# Patient Record
Sex: FEMALE
[De-identification: names, ages, dates, MRNs, and addresses within clinical notes are randomized per-mention and may not be internally consistent; named-entity substitution may affect disease eponyms.]

---

## 2019-03-07 ENCOUNTER — HOSPITAL ENCOUNTER (OUTPATIENT)
Dept: HOSPITAL 90 - EDH | Age: 59
Setting detail: OBSERVATION
LOS: 1 days | Discharge: HOME | End: 2019-03-08
Attending: INTERNAL MEDICINE | Admitting: INTERNAL MEDICINE
Payer: COMMERCIAL

## 2019-03-07 DIAGNOSIS — Z82.3: ICD-10-CM

## 2019-03-07 DIAGNOSIS — R20.0: Primary | ICD-10-CM

## 2019-03-07 DIAGNOSIS — Z82.49: ICD-10-CM

## 2019-03-07 DIAGNOSIS — Z88.8: ICD-10-CM

## 2019-03-07 DIAGNOSIS — I10: ICD-10-CM

## 2019-03-07 DIAGNOSIS — F41.9: ICD-10-CM

## 2019-03-07 DIAGNOSIS — Z83.3: ICD-10-CM

## 2019-03-07 DIAGNOSIS — Z82.0: ICD-10-CM

## 2019-03-07 DIAGNOSIS — Z82.5: ICD-10-CM

## 2019-03-07 DIAGNOSIS — Z87.442: ICD-10-CM

## 2019-03-07 DIAGNOSIS — Z79.899: ICD-10-CM

## 2019-03-07 LAB
ALBUMIN SERPL-MCNC: 3.8 G/DL (ref 3.5–5)
ALT SERPL-CCNC: 19 U/L (ref 12–78)
AST SERPL-CCNC: 14 U/L (ref 10–37)
BASOPHILS NFR BLD AUTO: 0.5 % (ref 0–5)
BILIRUB DIRECT SERPL-MCNC: 0.1 MG/DL (ref 0–0.3)
BILIRUB SERPL-MCNC: 0.7 MG/DL (ref 0.2–1)
BNP SERPL-MCNC: 5 PG/ML (ref 0–100)
BUN SERPL-MCNC: 13 MG/DL (ref 7–18)
CHLORIDE SERPL-SCNC: 103 MMOL/L (ref 101–111)
CK SERPL-CCNC: 217 U/L (ref 21–232)
CO2 SERPL-SCNC: 29 MMOL/L (ref 21–32)
CREAT SERPL-MCNC: 0.7 MG/DL (ref 0.5–1.5)
CRP SERPL HS-MCNC: 8.4 MG/L (ref 0–9)
EOSINOPHIL NFR BLD AUTO: 0.7 % (ref 0–8)
ERYTHROCYTE [DISTWIDTH] IN BLOOD BY AUTOMATED COUNT: 13.7 % (ref 11–15.5)
GFR SERPL CREATININE-BSD FRML MDRD: 91 ML/MIN (ref 60–?)
GLUCOSE SERPL-MCNC: 102 MG/DL (ref 70–105)
HBA1C MFR BLD: 5.9 % (ref 4–6)
HCT VFR BLD AUTO: 39.3 % (ref 36–48)
LYMPHOCYTES NFR SPEC AUTO: 17.2 % (ref 21–51)
MAGNESIUM SERPL-MCNC: 1.9 MG/DL (ref 1.8–2.4)
MCH RBC QN AUTO: 30.3 PG (ref 27–33)
MCHC RBC AUTO-ENTMCNC: 33.5 G/DL (ref 32–36)
MCV RBC AUTO: 90.2 FL (ref 79–99)
MONOCYTES NFR BLD AUTO: 6 % (ref 3–13)
NEUTROPHILS NFR BLD AUTO: 75.6 % (ref 40–77)
NRBC BLD MANUAL-RTO: 0 % (ref 0–0.19)
PH UR STRIP: 6.5 [PH] (ref 5–8)
PHOSPHATE SERPL-MCNC: 3.6 MG/DL (ref 2.5–4.9)
PLATELET # BLD AUTO: 268 K/UL (ref 130–400)
POTASSIUM SERPL-SCNC: 4.1 MMOL/L (ref 3.5–5.1)
PROT SERPL-MCNC: 7.8 G/DL (ref 6–8.3)
RBC # BLD AUTO: 4.36 MIL/UL (ref 4–5.5)
RBC #/AREA URNS HPF: (no result) /HPF (ref 0–1)
SODIUM SERPL-SCNC: 140 MMOL/L (ref 136–145)
SP GR UR STRIP: 1.01 (ref 1–1.03)
TSH SERPL DL<=0.05 MIU/L-ACNC: 1.1 UIU/ML (ref 0.36–3.74)
UROBILINOGEN UR STRIP-MCNC: 0.2 MG/DL (ref 0.2–1)
WBC # BLD AUTO: 10.5 K/UL (ref 4.8–10.8)
WBC #/AREA URNS HPF: (no result) /HPF (ref 0–1)

## 2019-03-07 PROCEDURE — 80048 BASIC METABOLIC PNL TOTAL CA: CPT

## 2019-03-07 PROCEDURE — 86140 C-REACTIVE PROTEIN: CPT

## 2019-03-07 PROCEDURE — 93306 TTE W/DOPPLER COMPLETE: CPT

## 2019-03-07 PROCEDURE — 84443 ASSAY THYROID STIM HORMONE: CPT

## 2019-03-07 PROCEDURE — 96372 THER/PROPH/DIAG INJ SC/IM: CPT

## 2019-03-07 PROCEDURE — 84484 ASSAY OF TROPONIN QUANT: CPT

## 2019-03-07 PROCEDURE — 83880 ASSAY OF NATRIURETIC PEPTIDE: CPT

## 2019-03-07 PROCEDURE — 70450 CT HEAD/BRAIN W/O DYE: CPT

## 2019-03-07 PROCEDURE — 85025 COMPLETE CBC W/AUTO DIFF WBC: CPT

## 2019-03-07 PROCEDURE — 84100 ASSAY OF PHOSPHORUS: CPT

## 2019-03-07 PROCEDURE — 80061 LIPID PANEL: CPT

## 2019-03-07 PROCEDURE — 82550 ASSAY OF CK (CPK): CPT

## 2019-03-07 PROCEDURE — 93005 ELECTROCARDIOGRAM TRACING: CPT

## 2019-03-07 PROCEDURE — 36415 COLL VENOUS BLD VENIPUNCTURE: CPT

## 2019-03-07 PROCEDURE — 93880 EXTRACRANIAL BILAT STUDY: CPT

## 2019-03-07 PROCEDURE — 80076 HEPATIC FUNCTION PANEL: CPT

## 2019-03-07 PROCEDURE — 70544 MR ANGIOGRAPHY HEAD W/O DYE: CPT

## 2019-03-07 PROCEDURE — 83036 HEMOGLOBIN GLYCOSYLATED A1C: CPT

## 2019-03-07 PROCEDURE — 82607 VITAMIN B-12: CPT

## 2019-03-07 PROCEDURE — 81001 URINALYSIS AUTO W/SCOPE: CPT

## 2019-03-07 PROCEDURE — 99284 EMERGENCY DEPT VISIT MOD MDM: CPT

## 2019-03-07 PROCEDURE — 83735 ASSAY OF MAGNESIUM: CPT

## 2019-03-07 PROCEDURE — 70551 MRI BRAIN STEM W/O DYE: CPT

## 2019-03-08 VITALS — SYSTOLIC BLOOD PRESSURE: 108 MMHG | DIASTOLIC BLOOD PRESSURE: 77 MMHG

## 2019-03-08 VITALS — DIASTOLIC BLOOD PRESSURE: 70 MMHG | SYSTOLIC BLOOD PRESSURE: 124 MMHG

## 2019-03-08 VITALS — SYSTOLIC BLOOD PRESSURE: 130 MMHG | DIASTOLIC BLOOD PRESSURE: 77 MMHG

## 2019-03-08 VITALS — SYSTOLIC BLOOD PRESSURE: 125 MMHG | DIASTOLIC BLOOD PRESSURE: 67 MMHG

## 2019-03-08 VITALS — DIASTOLIC BLOOD PRESSURE: 72 MMHG | SYSTOLIC BLOOD PRESSURE: 112 MMHG

## 2019-03-08 LAB
CHOLEST SERPL-MCNC: 148 MG/DL (ref ?–200)
HBA1C MFR BLD: 6 % (ref 4–6)
HDLC SERPL-MCNC: 53 MG/DL (ref 35–85)
LDLC SERPL CALC-MCNC: 90 MG/DL (ref 0–99)
TRIGL SERPL-MCNC: 67 MG/DL (ref 30–200)

## 2019-03-08 RX ADMIN — ACETAMINOPHEN PRN MG: 325 TABLET, FILM COATED ORAL at 08:13

## 2019-03-08 RX ADMIN — ACETAMINOPHEN PRN MG: 325 TABLET, FILM COATED ORAL at 18:53

## 2019-03-08 NOTE — NUR
DISCHARGE

PT'S 2D ECHO DONE AT SHIFT CHANGE NORMAL, DISCHARGED TO HOME AS PER ORDER. NO FURTHER 
COMPLAINTS OF ANY HEADACHES, NORMOTENSIVE, DISCHARGE INSTRUCTIONS GIVEN. PIV D/C, DRESSING 
APPLIED. TELEMETRY D/C. PT WAITED FOR A FRIEND TO PICK HER UP TO ASSIST WITH DISCHARGE. VS 
STABLE, NOT IN ANY FORM OF DISTRESS.

## 2019-03-08 NOTE — NUR
OANH NOTE

DCED IN OBS STATUS  TO HOME , NO NEEDS EXPRESSED BY PT TO PRIMARY RN, NO TRIGGERS RECD BY RN 
ASSESSMENT. 

-------------------------------------------------------------------------------

Addendum: 03/08/19 at 1944 by STACY GERARD RN CM

-------------------------------------------------------------------------------

Amended: Links added.

## 2021-02-08 LAB
BASOPHILS NFR BLD AUTO: 0.7 % (ref 0–5)
BUN SERPL-MCNC: 11 MG/DL (ref 7–18)
CHLORIDE SERPL-SCNC: 104 MMOL/L (ref 101–111)
CO2 SERPL-SCNC: 29 MMOL/L (ref 21–32)
CREAT SERPL-MCNC: 0.8 MG/DL (ref 0.5–1.5)
EOSINOPHIL NFR BLD AUTO: 1.9 % (ref 0–8)
ERYTHROCYTE [DISTWIDTH] IN BLOOD BY AUTOMATED COUNT: 13.4 % (ref 11–15.5)
GFR SERPL CREATININE-BSD FRML MDRD: 78 ML/MIN (ref 60–?)
GLUCOSE SERPL-MCNC: 110 MG/DL (ref 70–105)
HCT VFR BLD AUTO: 41.4 % (ref 36–48)
LYMPHOCYTES NFR SPEC AUTO: 28.2 % (ref 21–51)
MCH RBC QN AUTO: 29.5 PG (ref 27–33)
MCHC RBC AUTO-ENTMCNC: 31.6 G/DL (ref 32–36)
MCV RBC AUTO: 93.2 FL (ref 79–99)
MONOCYTES NFR BLD AUTO: 6.8 % (ref 3–13)
NEUTROPHILS NFR BLD AUTO: 61.9 % (ref 40–77)
NRBC BLD MANUAL-RTO: 0 % (ref 0–0.19)
PLATELET # BLD AUTO: 316 K/UL (ref 130–400)
POTASSIUM SERPL-SCNC: 3.9 MMOL/L (ref 3.5–5.1)
RBC # BLD AUTO: 4.44 MIL/UL (ref 4–5.5)
SODIUM SERPL-SCNC: 142 MMOL/L (ref 136–145)
WBC # BLD AUTO: 8.2 K/UL (ref 4.8–10.8)

## 2021-02-09 VITALS — DIASTOLIC BLOOD PRESSURE: 77 MMHG | SYSTOLIC BLOOD PRESSURE: 160 MMHG

## 2021-02-10 ENCOUNTER — HOSPITAL ENCOUNTER (OUTPATIENT)
Dept: HOSPITAL 90 - DAH | Age: 61
Discharge: HOME | End: 2021-02-10
Attending: ORTHOPAEDIC SURGERY
Payer: COMMERCIAL

## 2021-02-10 VITALS — DIASTOLIC BLOOD PRESSURE: 75 MMHG | SYSTOLIC BLOOD PRESSURE: 135 MMHG

## 2021-02-10 VITALS — DIASTOLIC BLOOD PRESSURE: 78 MMHG | SYSTOLIC BLOOD PRESSURE: 117 MMHG

## 2021-02-10 VITALS — WEIGHT: 208.2 LBS | HEIGHT: 64 IN | BODY MASS INDEX: 35.55 KG/M2

## 2021-02-10 VITALS — SYSTOLIC BLOOD PRESSURE: 123 MMHG | DIASTOLIC BLOOD PRESSURE: 73 MMHG

## 2021-02-10 VITALS — SYSTOLIC BLOOD PRESSURE: 140 MMHG | DIASTOLIC BLOOD PRESSURE: 80 MMHG

## 2021-02-10 VITALS — DIASTOLIC BLOOD PRESSURE: 81 MMHG | SYSTOLIC BLOOD PRESSURE: 125 MMHG

## 2021-02-10 VITALS — DIASTOLIC BLOOD PRESSURE: 75 MMHG | SYSTOLIC BLOOD PRESSURE: 134 MMHG

## 2021-02-10 VITALS — SYSTOLIC BLOOD PRESSURE: 127 MMHG | DIASTOLIC BLOOD PRESSURE: 76 MMHG

## 2021-02-10 VITALS — SYSTOLIC BLOOD PRESSURE: 111 MMHG | DIASTOLIC BLOOD PRESSURE: 76 MMHG

## 2021-02-10 VITALS — SYSTOLIC BLOOD PRESSURE: 132 MMHG | DIASTOLIC BLOOD PRESSURE: 87 MMHG

## 2021-02-10 VITALS — DIASTOLIC BLOOD PRESSURE: 85 MMHG | SYSTOLIC BLOOD PRESSURE: 145 MMHG

## 2021-02-10 VITALS — DIASTOLIC BLOOD PRESSURE: 89 MMHG | SYSTOLIC BLOOD PRESSURE: 131 MMHG

## 2021-02-10 VITALS — DIASTOLIC BLOOD PRESSURE: 89 MMHG | SYSTOLIC BLOOD PRESSURE: 159 MMHG

## 2021-02-10 VITALS — SYSTOLIC BLOOD PRESSURE: 134 MMHG | DIASTOLIC BLOOD PRESSURE: 78 MMHG

## 2021-02-10 VITALS — SYSTOLIC BLOOD PRESSURE: 132 MMHG | DIASTOLIC BLOOD PRESSURE: 85 MMHG

## 2021-02-10 VITALS — SYSTOLIC BLOOD PRESSURE: 130 MMHG | DIASTOLIC BLOOD PRESSURE: 78 MMHG

## 2021-02-10 VITALS — SYSTOLIC BLOOD PRESSURE: 123 MMHG | DIASTOLIC BLOOD PRESSURE: 80 MMHG

## 2021-02-10 VITALS — SYSTOLIC BLOOD PRESSURE: 117 MMHG | DIASTOLIC BLOOD PRESSURE: 44 MMHG

## 2021-02-10 VITALS — DIASTOLIC BLOOD PRESSURE: 76 MMHG | SYSTOLIC BLOOD PRESSURE: 124 MMHG

## 2021-02-10 VITALS — SYSTOLIC BLOOD PRESSURE: 145 MMHG | DIASTOLIC BLOOD PRESSURE: 85 MMHG

## 2021-02-10 VITALS — DIASTOLIC BLOOD PRESSURE: 76 MMHG | SYSTOLIC BLOOD PRESSURE: 107 MMHG

## 2021-02-10 DIAGNOSIS — M75.111: Primary | ICD-10-CM

## 2021-02-10 DIAGNOSIS — I10: ICD-10-CM

## 2021-02-10 DIAGNOSIS — M75.41: ICD-10-CM

## 2021-02-10 DIAGNOSIS — E66.01: ICD-10-CM

## 2021-02-10 DIAGNOSIS — Z82.49: ICD-10-CM

## 2021-02-10 DIAGNOSIS — M25.511: ICD-10-CM

## 2021-02-10 DIAGNOSIS — F41.9: ICD-10-CM

## 2021-02-10 DIAGNOSIS — M19.011: ICD-10-CM

## 2021-02-10 DIAGNOSIS — Z98.890: ICD-10-CM

## 2021-02-10 DIAGNOSIS — D17.79: ICD-10-CM

## 2021-02-10 DIAGNOSIS — G89.29: ICD-10-CM

## 2021-02-10 DIAGNOSIS — Z88.3: ICD-10-CM

## 2021-02-10 DIAGNOSIS — Z20.822: ICD-10-CM

## 2021-02-10 DIAGNOSIS — Z83.3: ICD-10-CM

## 2021-02-10 DIAGNOSIS — Z72.89: ICD-10-CM

## 2021-02-10 DIAGNOSIS — Z79.899: ICD-10-CM

## 2021-02-10 PROCEDURE — 64415 NJX AA&/STRD BRCH PLXS IMG: CPT

## 2021-02-10 PROCEDURE — 85025 COMPLETE CBC W/AUTO DIFF WBC: CPT

## 2021-02-10 PROCEDURE — 36415 COLL VENOUS BLD VENIPUNCTURE: CPT

## 2021-02-10 PROCEDURE — 23076 EXC SHOULDER TUM DEEP < 5 CM: CPT

## 2021-02-10 PROCEDURE — 29824 SHO ARTHRS SRG DSTL CLAVICLC: CPT

## 2021-02-10 PROCEDURE — 76942 ECHO GUIDE FOR BIOPSY: CPT

## 2021-02-10 PROCEDURE — 29826 SHO ARTHRS SRG DECOMPRESSION: CPT

## 2021-02-10 PROCEDURE — 29827 SHO ARTHRS SRG RT8TR CUF RPR: CPT

## 2021-02-10 PROCEDURE — 80048 BASIC METABOLIC PNL TOTAL CA: CPT

## 2021-02-10 RX ADMIN — SODIUM CHLORIDE SCH GM: 9 INJECTION, SOLUTION INTRAVENOUS at 10:25

## 2021-02-10 RX ADMIN — SODIUM CHLORIDE SCH GM: 9 INJECTION, SOLUTION INTRAVENOUS at 08:00

## 2021-11-21 ENCOUNTER — HOSPITAL ENCOUNTER (EMERGENCY)
Dept: HOSPITAL 90 - EDH | Age: 61
Discharge: HOME | End: 2021-11-21
Payer: COMMERCIAL

## 2021-11-21 VITALS — SYSTOLIC BLOOD PRESSURE: 145 MMHG | DIASTOLIC BLOOD PRESSURE: 69 MMHG

## 2021-11-21 VITALS — BODY MASS INDEX: 35.62 KG/M2 | HEIGHT: 63 IN | WEIGHT: 201.06 LBS

## 2021-11-21 DIAGNOSIS — M25.561: Primary | ICD-10-CM

## 2021-11-21 DIAGNOSIS — I10: ICD-10-CM

## 2021-11-21 DIAGNOSIS — Z79.899: ICD-10-CM

## 2021-11-21 DIAGNOSIS — Z79.51: ICD-10-CM

## 2021-11-21 DIAGNOSIS — Z79.1: ICD-10-CM

## 2021-11-21 DIAGNOSIS — Z91.041: ICD-10-CM

## 2021-11-21 DIAGNOSIS — M79.10: ICD-10-CM

## 2021-11-21 DIAGNOSIS — Z88.8: ICD-10-CM

## 2021-11-21 DIAGNOSIS — G43.909: ICD-10-CM

## 2021-11-21 DIAGNOSIS — F41.9: ICD-10-CM

## 2021-11-21 DIAGNOSIS — F32.A: ICD-10-CM

## 2021-11-21 LAB
ALBUMIN SERPL-MCNC: 3.4 G/DL (ref 3.5–5)
ALT SERPL-CCNC: 24 U/L (ref 12–78)
AST SERPL-CCNC: 15 U/L (ref 10–37)
BASOPHILS NFR BLD AUTO: 0.7 % (ref 0–5)
BILIRUB SERPL-MCNC: 0.5 MG/DL (ref 0.2–1)
BUN SERPL-MCNC: 11 MG/DL (ref 7–18)
CHLORIDE SERPL-SCNC: 105 MMOL/L (ref 101–111)
CO2 SERPL-SCNC: 29 MMOL/L (ref 21–32)
CREAT SERPL-MCNC: 0.7 MG/DL (ref 0.5–1.5)
EOSINOPHIL NFR BLD AUTO: 3 % (ref 0–8)
ERYTHROCYTE [DISTWIDTH] IN BLOOD BY AUTOMATED COUNT: 13.5 % (ref 11–15.5)
GFR SERPL CREATININE-BSD FRML MDRD: 90 ML/MIN (ref 60–?)
GLUCOSE SERPL-MCNC: 86 MG/DL (ref 70–105)
HCT VFR BLD AUTO: 37.2 % (ref 36–48)
INR PPP: 0.94 (ref 0.85–1.15)
LYMPHOCYTES NFR SPEC AUTO: 31.2 % (ref 21–51)
MCH RBC QN AUTO: 30 PG (ref 27–33)
MCHC RBC AUTO-ENTMCNC: 32.5 G/DL (ref 32–36)
MCV RBC AUTO: 92.3 FL (ref 79–99)
MONOCYTES NFR BLD AUTO: 8.6 % (ref 3–13)
NEUTROPHILS NFR BLD AUTO: 56.1 % (ref 40–77)
NRBC BLD MANUAL-RTO: 0 % (ref 0–0.19)
PLATELET # BLD AUTO: 267 K/UL (ref 130–400)
POTASSIUM SERPL-SCNC: 3.8 MMOL/L (ref 3.5–5.1)
PROT SERPL-MCNC: 7.2 G/DL (ref 6–8.3)
PROTHROMBIN TIME: 10.3 SEC (ref 9.6–11.6)
RBC # BLD AUTO: 4.03 MIL/UL (ref 4–5.5)
SODIUM SERPL-SCNC: 141 MMOL/L (ref 136–145)
WBC # BLD AUTO: 8.4 K/UL (ref 4.8–10.8)

## 2021-11-21 PROCEDURE — 93971 EXTREMITY STUDY: CPT

## 2021-11-21 PROCEDURE — 36415 COLL VENOUS BLD VENIPUNCTURE: CPT

## 2021-11-21 PROCEDURE — 85025 COMPLETE CBC W/AUTO DIFF WBC: CPT

## 2021-11-21 PROCEDURE — 96372 THER/PROPH/DIAG INJ SC/IM: CPT

## 2021-11-21 PROCEDURE — 73562 X-RAY EXAM OF KNEE 3: CPT

## 2021-11-21 PROCEDURE — 80053 COMPREHEN METABOLIC PANEL: CPT

## 2021-11-21 PROCEDURE — 99284 EMERGENCY DEPT VISIT MOD MDM: CPT

## 2021-11-21 PROCEDURE — 85610 PROTHROMBIN TIME: CPT
